# Patient Record
Sex: FEMALE | Race: WHITE | NOT HISPANIC OR LATINO | Employment: OTHER | URBAN - METROPOLITAN AREA
[De-identification: names, ages, dates, MRNs, and addresses within clinical notes are randomized per-mention and may not be internally consistent; named-entity substitution may affect disease eponyms.]

---

## 2023-09-28 PROBLEM — H93.293 ABNORMAL AUDITORY PERCEPTION OF BOTH EARS: Status: ACTIVE | Noted: 2023-09-28

## 2023-09-28 PROBLEM — H93.13 TINNITUS, BILATERAL: Status: ACTIVE | Noted: 2023-09-28

## 2023-09-28 PROBLEM — R51.9 PRESSURE IN HEAD: Status: ACTIVE | Noted: 2023-09-28

## 2023-09-28 PROBLEM — J35.8 TONSILLITH: Status: ACTIVE | Noted: 2023-09-28

## 2023-09-28 PROBLEM — J34.2 NASAL SEPTAL DEVIATION: Status: ACTIVE | Noted: 2023-09-28

## 2023-09-28 PROBLEM — T70.0XXA OTITIC BAROTRAUMA: Status: ACTIVE | Noted: 2023-09-28

## 2023-09-28 PROBLEM — M26.609 UNSPECIFIED TEMPOROMANDIBULAR JOINT DISORDER, UNSPECIFIED SIDE: Status: ACTIVE | Noted: 2023-09-28

## 2023-09-28 RX ORDER — ALUMINUM ZIRCONIUM OCTACHLOROHYDREX GLY 16 G/100G
GEL TOPICAL
COMMUNITY
Start: 2018-04-27

## 2023-09-28 RX ORDER — VERAPAMIL HYDROCHLORIDE 40 MG/1
TABLET ORAL
COMMUNITY
Start: 2018-03-23

## 2023-09-28 RX ORDER — ASPIRIN 81 MG/1
TABLET ORAL
COMMUNITY
Start: 2018-04-27

## 2023-09-28 RX ORDER — CHOLECALCIFEROL (VITAMIN D3) 25 MCG
TABLET ORAL
COMMUNITY
Start: 2018-04-27

## 2023-09-28 RX ORDER — HYOSCYAMINE SULFATE 0.12 MG/1
0.12 TABLET, ORALLY DISINTEGRATING ORAL
COMMUNITY
Start: 2017-10-13

## 2023-09-28 RX ORDER — CALCIUM CARBONATE 600 MG
TABLET ORAL
COMMUNITY
Start: 2018-04-27

## 2023-10-02 ENCOUNTER — APPOINTMENT (OUTPATIENT)
Dept: GASTROENTEROLOGY | Facility: HOSPITAL | Age: 68
End: 2023-10-02
Payer: MEDICARE
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
## 2024-04-15 ENCOUNTER — APPOINTMENT (OUTPATIENT)
Dept: SURGERY | Facility: CLINIC | Age: 69
End: 2024-04-15
Payer: MEDICARE

## 2024-04-17 NOTE — PROGRESS NOTES
Chief Complaint: diverticulitis      History Of Present Illness  Malu Yanes is a 68 y.o. female who presents to clinic today for second opinion of surgical options for CT proven diverticulitis which is complicated by rectocele and pelvic floor dysfunction.     Patient has a history rectocele repair x 3. Patients most recent admission and imaging at [facility] in February.     Subjective   Malu Yanes is here today with her . She states that her diverticulitis started about a year ago. She has concerns regarding the extent of the surgery that was described to her at [facility].     We discussed that her pelvic floor history complicated matters slightly.     Malu states she had a peritoneal repair which did not improve things followed by some testing which showed pelvic floor dysfunction.  She had another repair that gave her a year and a half of relief and then failed. These were all done by a physician that is now retired. She went for a second opinion at [facility] and was told she did not have a rectocele however she was subsequently sent for MR Defogram which did confirm a recurrent rectocele.     Her last diverticular attack was in January 2024. She has random sharp pains in her abdomen.       Bowel habits:  - Moves bowels several times a day  - Stool is soft  - she has to strain Yes - requires position changing and pressing on her right buttock  and spends 5 minutes on the toilet to have a BM.   - Issues with leakage or incontinence of stool: No   - Full episodes of incontinence: No   - Leakage of liquid stool or mucous: No   - Wears a pad daily: No   - Effects QoL: Moderate  - Pain with BM's: No  - Protruding Tissue: Yes . This occurs with. If YES:  - Does tissue spontaneously reduce No  - Manually reducible Yes ,   - Tissue prolapses with BM's only No  - Tissue prolapses spontaneously or with exercise/ movement: Yes   - Bleeding with BM's: No. Drips into toilet bowNo, On stoolNo, In stoolNo, On Toilet  Paper when you wipeNo        Colonoscopy (to cecum) on 1/4/24 ([facility]):   - several non-bleeding diverticula with medium openings.  - normal mucosa in the entire colon  - medium internal hemorrhoids noted  - repeat recommended in 10 years.     Past Medical History  She has a past medical history of Personal history of malignant neoplasm of breast, Personal history of other diseases of the circulatory system, Personal history of other diseases of the digestive system, Personal history of other diseases of the digestive system, Personal history of other diseases of the musculoskeletal system and connective tissue, Personal history of other diseases of urinary system, Personal history of other endocrine, nutritional and metabolic disease, and Personal history of other specified conditions.    Surgical History  She has a past surgical history that includes Hysterectomy (04/27/2018); Appendectomy (04/27/2018); Mastectomy (04/27/2018); Other surgical history (04/19/2022); Other surgical history (04/19/2022); and Other surgical history (04/05/2022).     Social History  She reports that she has never smoked. She has never used smokeless tobacco. She reports that she does not currently use alcohol. She reports that she does not use drugs.    Family History  Family History   Problem Relation Name Age of Onset    Other (Heart Problem) Mother      Other (Compliactions due to general anesthesia) Father      Other (Heart Problem) Father      Hearing loss Brother      Cancer Brother      Cancer Daughter          Allergies  Cefaclor, Cephalosporins, Codeine, Erythromycin, Flecainide, Meperidine, Metronidazole, Moxifloxacin, Niacin, Nitrofurantoin, Nsaids (non-steroidal anti-inflammatory drug), Opioids - morphine analogues, Penicillins, Quinolones, and Sumatriptan    Home Medications  Prior to Admission medications    Medication Sig Start Date End Date Taking? Authorizing Provider   aspirin 81 mg EC tablet Take by mouth.  "4/27/18   Historical Provider, MD   Bifidobacterium infantis (Align) 4 mg capsule Take by mouth. 4/27/18   Historical Provider, MD   calcium carbonate 600 mg calcium (1,500 mg) tablet Take by mouth. 4/27/18   Historical Provider, MD   cholecalciferol (Vitamin D-3) 25 MCG (1000 UT) tablet Take by mouth. 4/27/18   Historical Provider, MD   famotidine (PEPCID AC ORAL) Take by mouth. 4/27/18   Historical Provider, MD   hyoscyamine 0.125 mg disintegrating tablet Place 1 tablet (0.125 mg) under the tongue. EVERY FOUR TO SIX HOURS AS NEEDED 10/13/17   Historical Provider, MD   verapamil (Calan) 40 mg tablet Take by mouth. 3/23/18   Historical Provider, MD         Review of Systems   Gastrointestinal:  Positive for abdominal pain. Negative for abdominal distention, anal bleeding, blood in stool, constipation, diarrhea, nausea, rectal pain and vomiting.   All other systems reviewed and are negative.        Imaging:  No results found.       Labs:   No results found for: \"LABBILI\", \"BILIDIR\", \"AST\", \"ALT\", \"ALKPHOS\", \"PROT\", \"ALBUMIN\", \"AMYLASE\", \"CEA\"       Physical Exam  Vitals reviewed.   HENT:      Head: Normocephalic.   Eyes:      Extraocular Movements: Extraocular movements intact.   Cardiovascular:      Rate and Rhythm: Normal rate.   Pulmonary:      Effort: Pulmonary effort is normal.   Abdominal:      General: There is no distension.      Palpations: Abdomen is soft. There is no mass.      Tenderness: There is no abdominal tenderness.      Hernia: No hernia is present.   Musculoskeletal:         General: Normal range of motion.      Cervical back: Normal range of motion.   Skin:     General: Skin is warm and dry.   Neurological:      General: No focal deficit present.      Mental Status: She is alert.   Psychiatric:         Mood and Affect: Mood normal.            Last Recorded Vitals  Blood pressure 120/83, pulse 85, resp. rate 16, height 1.575 m (5' 2\"), weight 63 kg (139 lb).      Assessment/Plan     We discussed " the diagnosis of diverticular disease, anatomy, pathophysiology, indications for surgery, treatment goals and alternatives, risks and benefits in detail.     We discussed the incidence of recurrence, preoperative, perioperative and postoperative course, preoperative preparation and possibility of conversion to an open procedure, and ileus in detail.     We discussed the procedure of cystoscopy, ureteral stent placement, sigmoid colon resection, and colorectal anastomosis. We discussed the laparoscopic and and open approaches. The possibility of a stoma was discussed.     We reviewed how medical co-morbidities impact perioperative/operative risk and postoperative course.    Discussed my concerns about performing a rectocele repair after she has had three prior attempts. I think it would be reasonable to repair the rectal prolapse with rectopexy and perform a sigmoid resection for her diverticular disease at the same time with diverting loop ileostomy or permanent colostomy bag. Would not recommend use of mesh at that time.    I will ask one of our urogynecologists to review her case and provide his opinion as well.      Malu Yanes will follow up with our office if needed.     I spent a total of 60 min on this patient visit.        Dr. Leslie Slaughter  4/19/2024

## 2024-04-19 ENCOUNTER — OFFICE VISIT (OUTPATIENT)
Dept: SURGERY | Facility: CLINIC | Age: 69
End: 2024-04-19
Payer: MEDICARE

## 2024-04-19 VITALS
RESPIRATION RATE: 16 BRPM | SYSTOLIC BLOOD PRESSURE: 120 MMHG | HEART RATE: 85 BPM | DIASTOLIC BLOOD PRESSURE: 83 MMHG | HEIGHT: 62 IN | BODY MASS INDEX: 25.58 KG/M2 | WEIGHT: 139 LBS

## 2024-04-19 DIAGNOSIS — K57.92 DIVERTICULITIS: Primary | ICD-10-CM

## 2024-04-19 DIAGNOSIS — N81.89 OTHER FEMALE GENITAL PROLAPSE: Primary | ICD-10-CM

## 2024-04-19 PROCEDURE — 1125F AMNT PAIN NOTED PAIN PRSNT: CPT | Performed by: SURGERY

## 2024-04-19 PROCEDURE — 99205 OFFICE O/P NEW HI 60 MIN: CPT | Performed by: SURGERY

## 2024-04-19 PROCEDURE — 1159F MED LIST DOCD IN RCRD: CPT | Performed by: SURGERY

## 2024-04-19 PROCEDURE — 1036F TOBACCO NON-USER: CPT | Performed by: SURGERY

## 2024-04-19 ASSESSMENT — PAIN SCALES - GENERAL: PAINLEVEL: 2

## 2024-04-19 ASSESSMENT — ENCOUNTER SYMPTOMS
VOMITING: 0
ABDOMINAL DISTENTION: 0
ANAL BLEEDING: 0
RECTAL PAIN: 0
DIARRHEA: 0
CONSTIPATION: 0
ABDOMINAL PAIN: 1
BLOOD IN STOOL: 0
NAUSEA: 0

## 2024-05-07 ENCOUNTER — OFFICE VISIT (OUTPATIENT)
Dept: UROLOGY | Facility: CLINIC | Age: 69
End: 2024-05-07
Payer: MEDICARE

## 2024-05-07 VITALS — DIASTOLIC BLOOD PRESSURE: 79 MMHG | SYSTOLIC BLOOD PRESSURE: 127 MMHG

## 2024-05-07 DIAGNOSIS — R10.2 PELVIC PAIN: Primary | ICD-10-CM

## 2024-05-07 DIAGNOSIS — M62.89 PELVIC FLOOR DYSFUNCTION: ICD-10-CM

## 2024-05-07 DIAGNOSIS — K59.00 CONSTIPATION, UNSPECIFIED CONSTIPATION TYPE: ICD-10-CM

## 2024-05-07 LAB
POC APPEARANCE, URINE: CLEAR
POC BILIRUBIN, URINE: NEGATIVE
POC BLOOD, URINE: NEGATIVE
POC COLOR, URINE: YELLOW
POC GLUCOSE, URINE: NEGATIVE MG/DL
POC KETONES, URINE: NEGATIVE MG/DL
POC LEUKOCYTES, URINE: NEGATIVE
POC NITRITE,URINE: NEGATIVE
POC PH, URINE: 5.5 PH
POC PROTEIN, URINE: NEGATIVE MG/DL
POC SPECIFIC GRAVITY, URINE: 1.02
POC UROBILINOGEN, URINE: 0.2 EU/DL

## 2024-05-07 PROCEDURE — 99205 OFFICE O/P NEW HI 60 MIN: CPT | Performed by: STUDENT IN AN ORGANIZED HEALTH CARE EDUCATION/TRAINING PROGRAM

## 2024-05-07 PROCEDURE — 1159F MED LIST DOCD IN RCRD: CPT | Performed by: STUDENT IN AN ORGANIZED HEALTH CARE EDUCATION/TRAINING PROGRAM

## 2024-05-07 NOTE — LETTER
May 8, 2024     Leslie Slaughter MD    Patient: Malu Yanes   YOB: 1955   Date of Visit: 5/7/2024       Dear Dr. Leslie Slaughter MD:    Thank you for referring Malu Yanes to me for evaluation. Below are my notes for this consultation.  If you have questions, please do not hesitate to call me. I look forward to following your patient along with you.       Sincerely,     Dmitriy Das MD      CC: Julieth Day MD  ______________________________________________________________________________________    HISTORY OF PRESENT ILLNESS:  Malu Yanes is a 68 y.o. female who presents today as a new patient, referred by Dr. Slaughter.  patient has an extensive history of pelvic floor reconstruction.  She had a vaginal hysterectomy with what sounds like a uterosacral suspension by Dr. Avalos in the early 2010s.  She then continued to complain of splinting and difficulty with bowel movements.  She had a rectocele on exam which was repaired.  She then continued to have issues exam was supposedly normal but she had a defecography which demonstrated a rectocele this was repaired again.  She was doing well for a few years but now reports that she again has to splint and is very uncomfortable.  She feels her stool gets stuck.  She would likely sexually active but Because of the pain.  She has no urinary complaints.  She states she has to push on her buttocks to have bowel movements. She had a perineum repair in the past. She has had rectocele repair in the past as well.  She also has diverticulitis and may need a sigmoidectomy.           Past Medical History  She has a past medical history of Personal history of malignant neoplasm of breast, Personal history of other diseases of the circulatory system, Personal history of other diseases of the digestive system, Personal history of other diseases of the digestive system, Personal history of other diseases of the  "musculoskeletal system and connective tissue, Personal history of other diseases of urinary system, Personal history of other endocrine, nutritional and metabolic disease, and Personal history of other specified conditions.    Surgical History  She has a past surgical history that includes Hysterectomy (04/27/2018); Appendectomy (04/27/2018); Mastectomy (04/27/2018); Other surgical history (04/19/2022); Other surgical history (04/19/2022); and Other surgical history (04/05/2022).     Social History  She reports that she has never smoked. She has never used smokeless tobacco. She reports that she does not currently use alcohol. She reports that she does not use drugs.    Family History  Family History   Problem Relation Name Age of Onset   • Other (Heart Problem) Mother     • Other (Compliactions due to general anesthesia) Father     • Other (Heart Problem) Father     • Hearing loss Brother     • Cancer Brother     • Cancer Daughter          Allergies  Cefaclor, Cephalosporins, Codeine, Erythromycin, Flecainide, Meperidine, Metronidazole, Moxifloxacin, Niacin, Nitrofurantoin, Nsaids (non-steroidal anti-inflammatory drug), Opioids - morphine analogues, Penicillins, Quinolones, and Sumatriptan        A comprehensive 10+ review of systems was negative except for: see hpi                    PHYSICAL EXAMINATION:  BP Readings from Last 3 Encounters:   05/07/24 127/79   04/19/24 120/83   04/05/22 118/71      Wt Readings from Last 3 Encounters:   04/19/24 63 kg (139 lb)   04/05/22 59.4 kg (131 lb)      BMI: Estimated body mass index is 25.42 kg/m² as calculated from the following:    Height as of 4/19/24: 1.575 m (5' 2\").    Weight as of 4/19/24: 63 kg (139 lb).  BSA: Estimated body surface area is 1.66 meters squared as calculated from the following:    Height as of 4/19/24: 1.575 m (5' 2\").    Weight as of 4/19/24: 63 kg (139 lb).  HEENT: Normocephalic, atraumatic, PER EOMI, nonicteric, trachea normal, thyroid normal, " oropharynx normal.  CARDIAC: regular rate & rhythm, S1 & S2 normal.  No heaves, thrills, gallops or murmurs.  LUNGS: Clear to auscultation, no spinal or CV tenderness.  EXTREMITIES: No evidence of cyanosis, clubbing or edema.      Pelvic:  Genitourinary:  normal external genitalia, Barth[place]'s glands negative, [place]'s glands negative  Urethra   normal meatus, non-tender, no periurethral mass  Vaginal mucosa  normal  Cervix surgically absent  Uterus surgically absent  Adnexae  negative nontender, no masses  Atrophy negative    CST negative  Pelvic floor muscle contraction  4/5    POP-Q (in supine position):        Aa -2     Ba -2     C -3              gh 3     pb 3     tvl 8              Ap -2     Bp -2     D     Rectal: no hemorrhoids, fissures or masses        IMPRESSION AND PLAN:          Assessment:  67 yo female who presents as a new patient with a history of rectocele and diverticulitis and difficulty having bowel movements.    Defecatory dysfunction:  On exam patient may have an enterocele, I am not able to appreciate pocketing or a rectocele on my exam.    I did discuss with her that difficulty of diagnosing bowel dysfunction, and even if she has an abnormal defecography  and I performed repair she may continue to have her bowel symptoms.  She understands this and if there is an abnormality on exam she wishes to proceed.    I also discussed with her that what ever I do is unrelated to what she would need to do regarding her diverticulitis but if she does need a surgery with Dr. Slaughter we can do that as a combined case.      All questions and concerns were answered and addressed.  The patient expressed understanding and agrees with the plan.     Dmitriy Das MD    Scribe Attestation  By signing my name below, IFawn Scribe   attest that this documentation has been prepared under the direction and in the presence of Dmitriy Das MD.

## 2024-05-07 NOTE — PROGRESS NOTES
HISTORY OF PRESENT ILLNESS:  Malu Yanes is a 68 y.o. female who presents today as a new patient, referred by Dr. Slaughter.  patient has an extensive history of pelvic floor reconstruction.  She had a vaginal hysterectomy with what sounds like a uterosacral suspension by Dr. Avalos in the early 2010s.  She then continued to complain of splinting and difficulty with bowel movements.  She had a rectocele on exam which was repaired.  She then continued to have issues exam was supposedly normal but she had a defecography which demonstrated a rectocele this was repaired again.  She was doing well for a few years but now reports that she again has to splint and is very uncomfortable.  She feels her stool gets stuck.  She would likely sexually active but Because of the pain.  She has no urinary complaints.  She states she has to push on her buttocks to have bowel movements. She had a perineum repair in the past. She has had rectocele repair in the past as well.  She also has diverticulitis and may need a sigmoidectomy.           Past Medical History  She has a past medical history of Personal history of malignant neoplasm of breast, Personal history of other diseases of the circulatory system, Personal history of other diseases of the digestive system, Personal history of other diseases of the digestive system, Personal history of other diseases of the musculoskeletal system and connective tissue, Personal history of other diseases of urinary system, Personal history of other endocrine, nutritional and metabolic disease, and Personal history of other specified conditions.    Surgical History  She has a past surgical history that includes Hysterectomy (04/27/2018); Appendectomy (04/27/2018); Mastectomy (04/27/2018); Other surgical history (04/19/2022); Other surgical history (04/19/2022); and Other surgical history (04/05/2022).     Social History  She reports that she has never smoked. She has never used  "smokeless tobacco. She reports that she does not currently use alcohol. She reports that she does not use drugs.    Family History  Family History   Problem Relation Name Age of Onset    Other (Heart Problem) Mother      Other (Compliactions due to general anesthesia) Father      Other (Heart Problem) Father      Hearing loss Brother      Cancer Brother      Cancer Daughter          Allergies  Cefaclor, Cephalosporins, Codeine, Erythromycin, Flecainide, Meperidine, Metronidazole, Moxifloxacin, Niacin, Nitrofurantoin, Nsaids (non-steroidal anti-inflammatory drug), Opioids - morphine analogues, Penicillins, Quinolones, and Sumatriptan        A comprehensive 10+ review of systems was negative except for: see hpi                    PHYSICAL EXAMINATION:  BP Readings from Last 3 Encounters:   05/07/24 127/79   04/19/24 120/83   04/05/22 118/71      Wt Readings from Last 3 Encounters:   04/19/24 63 kg (139 lb)   04/05/22 59.4 kg (131 lb)      BMI: Estimated body mass index is 25.42 kg/m² as calculated from the following:    Height as of 4/19/24: 1.575 m (5' 2\").    Weight as of 4/19/24: 63 kg (139 lb).  BSA: Estimated body surface area is 1.66 meters squared as calculated from the following:    Height as of 4/19/24: 1.575 m (5' 2\").    Weight as of 4/19/24: 63 kg (139 lb).  HEENT: Normocephalic, atraumatic, PER EOMI, nonicteric, trachea normal, thyroid normal, oropharynx normal.  CARDIAC: regular rate & rhythm, S1 & S2 normal.  No heaves, thrills, gallops or murmurs.  LUNGS: Clear to auscultation, no spinal or CV tenderness.  EXTREMITIES: No evidence of cyanosis, clubbing or edema.      Pelvic:  Genitourinary:  normal external genitalia, Bartholin's glands negative, [place]'s glands negative  Urethra   normal meatus, non-tender, no periurethral mass  Vaginal mucosa  normal  Cervix surgically absent  Uterus surgically absent  Adnexae  negative nontender, no masses  Atrophy negative    CST negative  Pelvic floor muscle " contraction  4/5    POP-Q (in supine position):        Aa -2     Ba -2     C -3              gh 3     pb 3     tvl 8              Ap -2     Bp -2     D     Rectal: no hemorrhoids, fissures or masses        IMPRESSION AND PLAN:          Assessment:  67 yo female who presents as a new patient with a history of rectocele and diverticulitis and difficulty having bowel movements.    Defecatory dysfunction:  On exam patient may have an enterocele, I am not able to appreciate pocketing or a rectocele on my exam.    I did discuss with her that difficulty of diagnosing bowel dysfunction, and even if she has an abnormal defecography  and I performed repair she may continue to have her bowel symptoms.  She understands this and if there is an abnormality on exam she wishes to proceed.    I also discussed with her that what ever I do is unrelated to what she would need to do regarding her diverticulitis but if she does need a surgery with Dr. Slaughter we can do that as a combined case.      All questions and concerns were answered and addressed.  The patient expressed understanding and agrees with the plan.     Dmitriy Das MD    Scribe Attestation  By signing my name below, IFawn, Scribkeya   attest that this documentation has been prepared under the direction and in the presence of Dmitriy Das MD.

## 2024-06-10 ENCOUNTER — TELEPHONE (OUTPATIENT)
Dept: UROLOGY | Facility: CLINIC | Age: 69
End: 2024-06-10
Payer: MEDICARE

## 2024-06-10 ENCOUNTER — APPOINTMENT (OUTPATIENT)
Dept: RADIOLOGY | Facility: HOSPITAL | Age: 69
End: 2024-06-10
Payer: MEDICARE

## 2024-06-10 NOTE — TELEPHONE ENCOUNTER
Patient was scheduled for MRI today, she was not able to do it due to extreme fear and cancelled it.  She said she would have to be knocked out to have done.  Is there any other option she would have  Thank you

## 2024-06-30 DIAGNOSIS — R10.2 PELVIC PAIN: ICD-10-CM

## 2024-06-30 DIAGNOSIS — M62.89 PELVIC FLOOR DYSFUNCTION: Primary | ICD-10-CM

## 2024-07-02 ENCOUNTER — TELEMEDICINE (OUTPATIENT)
Dept: UROLOGY | Facility: CLINIC | Age: 69
End: 2024-07-02
Payer: MEDICARE

## 2024-07-02 DIAGNOSIS — R15.9 INCONTINENCE OF FECES, UNSPECIFIED FECAL INCONTINENCE TYPE: Primary | ICD-10-CM

## 2024-07-02 PROCEDURE — 99442 PR PHYS/QHP TELEPHONE EVALUATION 11-20 MIN: CPT | Performed by: STUDENT IN AN ORGANIZED HEALTH CARE EDUCATION/TRAINING PROGRAM

## 2024-07-02 NOTE — PROGRESS NOTES
Virtual or Telephone Consent    A telephone visit (audio only) between the patient (at the originating site) and the provider (at the distant site) was utilized to provide this telehealth service.   Verbal consent was requested and obtained from Malu Yanes on this date, 07/03/24 for a telehealth visit.       HISTORY OF PRESENT ILLNESS:  Malu Yanes is a 68 y.o. female who presents today for a virtual follow up visit. She reports she does not feel okay going into a tube for an MRI and is worried about a major panic attack.          Past Medical History  She has a past medical history of Personal history of malignant neoplasm of breast, Personal history of other diseases of the circulatory system, Personal history of other diseases of the digestive system, Personal history of other diseases of the digestive system, Personal history of other diseases of the musculoskeletal system and connective tissue, Personal history of other diseases of urinary system, Personal history of other endocrine, nutritional and metabolic disease, and Personal history of other specified conditions.    Surgical History  She has a past surgical history that includes Hysterectomy (04/27/2018); Appendectomy (04/27/2018); Mastectomy (04/27/2018); Other surgical history (04/19/2022); Other surgical history (04/19/2022); and Other surgical history (04/05/2022).     Social History  She reports that she has never smoked. She has never used smokeless tobacco. She reports that she does not currently use alcohol. She reports that she does not use drugs.    Family History  Family History   Problem Relation Name Age of Onset    Other (Heart Problem) Mother      Other (Compliactions due to general anesthesia) Father      Other (Heart Problem) Father      Hearing loss Brother      Cancer Brother      Cancer Daughter          Allergies  Cefaclor, Cephalosporins, Codeine, Erythromycin, Flecainide, Meperidine, Metronidazole, Moxifloxacin,  Niacin, Nitrofurantoin, Nsaids (non-steroidal anti-inflammatory drug), Opioids - morphine analogues, Penicillins, Quinolones, and Sumatriptan      A comprehensive 10+ review of systems was negative except for: see hpi                       Assessment:  67 yo female who presents as a new patient with a history of rectocele and diverticulitis and difficulty having bowel movements.     Defecatory dysfunction:  On exam patient may have an enterocele, I am not able to appreciate pocketing or a rectocele on my exam.     I did discuss with her that difficulty of diagnosing bowel dysfunction, and even if she has an abnormal defecography  and I performed repair she may continue to have her bowel symptoms.  She understands this and if there is an abnormality on exam she wishes to proceed.    She has a lot of anxiety and claustrophobia related to the MRI, explained to her that this cannot be performed under sedation but we can try x-ray  defecography     Follow-up after testing    I spent a total of 11 minutes speaking with the patient on the telephone      All questions and concerns were answered and addressed.  The patient expressed understanding and agrees with the plan.     Dmitriy Das MD    Scribe Attestation  By signing my name below, I, Nina Colin   attest that this documentation has been prepared under the direction and in the presence of Dmitriy Das MD.

## 2024-07-19 ENCOUNTER — TELEPHONE (OUTPATIENT)
Dept: UROLOGY | Facility: CLINIC | Age: 69
End: 2024-07-19
Payer: MEDICARE

## 2024-07-19 NOTE — TELEPHONE ENCOUNTER
Called patient and gave her [facility]'s radiology department. Advised her to call us when she got the appointment scheduled

## 2024-08-29 ENCOUNTER — APPOINTMENT (OUTPATIENT)
Dept: OBSTETRICS AND GYNECOLOGY | Facility: CLINIC | Age: 69
End: 2024-08-29
Payer: MEDICARE